# Patient Record
Sex: FEMALE | Race: WHITE | NOT HISPANIC OR LATINO | URBAN - METROPOLITAN AREA
[De-identification: names, ages, dates, MRNs, and addresses within clinical notes are randomized per-mention and may not be internally consistent; named-entity substitution may affect disease eponyms.]

---

## 2022-05-06 ENCOUNTER — EMERGENCY (EMERGENCY)
Facility: HOSPITAL | Age: 22
LOS: 1 days | Discharge: HOME | End: 2022-05-06
Attending: EMERGENCY MEDICINE | Admitting: EMERGENCY MEDICINE
Payer: COMMERCIAL

## 2022-05-06 VITALS
RESPIRATION RATE: 16 BRPM | TEMPERATURE: 98 F | DIASTOLIC BLOOD PRESSURE: 92 MMHG | HEIGHT: 70 IN | OXYGEN SATURATION: 98 % | SYSTOLIC BLOOD PRESSURE: 130 MMHG | WEIGHT: 160.06 LBS | HEART RATE: 62 BPM

## 2022-05-06 DIAGNOSIS — R53.83 OTHER FATIGUE: ICD-10-CM

## 2022-05-06 DIAGNOSIS — Z20.822 CONTACT WITH AND (SUSPECTED) EXPOSURE TO COVID-19: ICD-10-CM

## 2022-05-06 DIAGNOSIS — F32.A DEPRESSION, UNSPECIFIED: ICD-10-CM

## 2022-05-06 DIAGNOSIS — D69.6 THROMBOCYTOPENIA, UNSPECIFIED: ICD-10-CM

## 2022-05-06 LAB
ALBUMIN SERPL ELPH-MCNC: 4.6 G/DL — SIGNIFICANT CHANGE UP (ref 3.3–5)
ALP SERPL-CCNC: 66 U/L — SIGNIFICANT CHANGE UP (ref 40–120)
ALT FLD-CCNC: 10 U/L — SIGNIFICANT CHANGE UP (ref 10–45)
ANION GAP SERPL CALC-SCNC: 9 MMOL/L — SIGNIFICANT CHANGE UP (ref 5–17)
APPEARANCE UR: CLEAR — SIGNIFICANT CHANGE UP
APTT BLD: 28.9 SEC — SIGNIFICANT CHANGE UP (ref 27.5–35.5)
AST SERPL-CCNC: 15 U/L — SIGNIFICANT CHANGE UP (ref 10–40)
BACTERIA # UR AUTO: SIGNIFICANT CHANGE UP /HPF
BASOPHILS # BLD AUTO: 0.06 K/UL — SIGNIFICANT CHANGE UP (ref 0–0.2)
BASOPHILS NFR BLD AUTO: 0.9 % — SIGNIFICANT CHANGE UP (ref 0–2)
BILIRUB SERPL-MCNC: 0.5 MG/DL — SIGNIFICANT CHANGE UP (ref 0.2–1.2)
BILIRUB UR-MCNC: NEGATIVE — SIGNIFICANT CHANGE UP
BUN SERPL-MCNC: 11 MG/DL — SIGNIFICANT CHANGE UP (ref 7–23)
CALCIUM SERPL-MCNC: 9.4 MG/DL — SIGNIFICANT CHANGE UP (ref 8.4–10.5)
CHLORIDE SERPL-SCNC: 106 MMOL/L — SIGNIFICANT CHANGE UP (ref 96–108)
CO2 SERPL-SCNC: 26 MMOL/L — SIGNIFICANT CHANGE UP (ref 22–31)
COLOR SPEC: YELLOW — SIGNIFICANT CHANGE UP
CREAT SERPL-MCNC: 0.91 MG/DL — SIGNIFICANT CHANGE UP (ref 0.5–1.3)
DACRYOCYTES BLD QL SMEAR: SLIGHT — SIGNIFICANT CHANGE UP
DIFF PNL FLD: NEGATIVE — SIGNIFICANT CHANGE UP
EGFR: 93 ML/MIN/1.73M2 — SIGNIFICANT CHANGE UP
EOSINOPHIL # BLD AUTO: 0.12 K/UL — SIGNIFICANT CHANGE UP (ref 0–0.5)
EOSINOPHIL NFR BLD AUTO: 1.8 % — SIGNIFICANT CHANGE UP (ref 0–6)
EPI CELLS # UR: SIGNIFICANT CHANGE UP /HPF (ref 0–5)
GIANT PLATELETS BLD QL SMEAR: PRESENT — SIGNIFICANT CHANGE UP
GLUCOSE SERPL-MCNC: 113 MG/DL — HIGH (ref 70–99)
GLUCOSE UR QL: NEGATIVE — SIGNIFICANT CHANGE UP
HAV IGM SER-ACNC: SIGNIFICANT CHANGE UP
HBV CORE AB SER-ACNC: SIGNIFICANT CHANGE UP
HBV CORE IGM SER-ACNC: SIGNIFICANT CHANGE UP
HBV SURFACE AB SER-ACNC: SIGNIFICANT CHANGE UP
HBV SURFACE AG SER-ACNC: SIGNIFICANT CHANGE UP
HCG UR QL: NEGATIVE — SIGNIFICANT CHANGE UP
HCT VFR BLD CALC: 39 % — SIGNIFICANT CHANGE UP (ref 34.5–45)
HCV AB S/CO SERPL IA: 0.06 S/CO — SIGNIFICANT CHANGE UP
HCV AB SERPL-IMP: SIGNIFICANT CHANGE UP
HGB BLD-MCNC: 13.6 G/DL — SIGNIFICANT CHANGE UP (ref 11.5–15.5)
HIV 1+2 AB+HIV1 P24 AG SERPL QL IA: SIGNIFICANT CHANGE UP
INR BLD: 1.04 — SIGNIFICANT CHANGE UP (ref 0.88–1.16)
KETONES UR-MCNC: NEGATIVE — SIGNIFICANT CHANGE UP
LDH SERPL L TO P-CCNC: 179 U/L — SIGNIFICANT CHANGE UP (ref 50–242)
LEUKOCYTE ESTERASE UR-ACNC: NEGATIVE — SIGNIFICANT CHANGE UP
LYMPHOCYTES # BLD AUTO: 1.8 K/UL — SIGNIFICANT CHANGE UP (ref 1–3.3)
LYMPHOCYTES # BLD AUTO: 26.1 % — SIGNIFICANT CHANGE UP (ref 13–44)
MANUAL SMEAR VERIFICATION: SIGNIFICANT CHANGE UP
MCHC RBC-ENTMCNC: 33.3 PG — SIGNIFICANT CHANGE UP (ref 27–34)
MCHC RBC-ENTMCNC: 34.9 GM/DL — SIGNIFICANT CHANGE UP (ref 32–36)
MCV RBC AUTO: 95.4 FL — SIGNIFICANT CHANGE UP (ref 80–100)
MONOCYTES # BLD AUTO: 0.5 K/UL — SIGNIFICANT CHANGE UP (ref 0–0.9)
MONOCYTES NFR BLD AUTO: 7.2 % — SIGNIFICANT CHANGE UP (ref 2–14)
NEUTROPHILS # BLD AUTO: 4.42 K/UL — SIGNIFICANT CHANGE UP (ref 1.8–7.4)
NEUTROPHILS NFR BLD AUTO: 64 % — SIGNIFICANT CHANGE UP (ref 43–77)
NITRITE UR-MCNC: NEGATIVE — SIGNIFICANT CHANGE UP
PH UR: 6 — SIGNIFICANT CHANGE UP (ref 5–8)
PLAT MORPH BLD: NORMAL — SIGNIFICANT CHANGE UP
PLATELET # BLD AUTO: 72 K/UL — LOW (ref 150–400)
POIKILOCYTOSIS BLD QL AUTO: SLIGHT — SIGNIFICANT CHANGE UP
POTASSIUM SERPL-MCNC: 4.2 MMOL/L — SIGNIFICANT CHANGE UP (ref 3.5–5.3)
POTASSIUM SERPL-SCNC: 4.2 MMOL/L — SIGNIFICANT CHANGE UP (ref 3.5–5.3)
PROT SERPL-MCNC: 7 G/DL — SIGNIFICANT CHANGE UP (ref 6–8.3)
PROT UR-MCNC: ABNORMAL MG/DL
PROTHROM AB SERPL-ACNC: 12.4 SEC — SIGNIFICANT CHANGE UP (ref 10.5–13.4)
RBC # BLD: 4.09 M/UL — SIGNIFICANT CHANGE UP (ref 3.8–5.2)
RBC # FLD: 12 % — SIGNIFICANT CHANGE UP (ref 10.3–14.5)
RBC BLD AUTO: ABNORMAL
RBC CASTS # UR COMP ASSIST: < 5 /HPF — SIGNIFICANT CHANGE UP
SARS-COV-2 RNA SPEC QL NAA+PROBE: NEGATIVE — SIGNIFICANT CHANGE UP
SMUDGE CELLS # BLD: PRESENT — SIGNIFICANT CHANGE UP
SODIUM SERPL-SCNC: 141 MMOL/L — SIGNIFICANT CHANGE UP (ref 135–145)
SP GR SPEC: 1.02 — SIGNIFICANT CHANGE UP (ref 1–1.03)
UROBILINOGEN FLD QL: 0.2 E.U./DL — SIGNIFICANT CHANGE UP
WBC # BLD: 6.9 K/UL — SIGNIFICANT CHANGE UP (ref 3.8–10.5)
WBC # FLD AUTO: 6.9 K/UL — SIGNIFICANT CHANGE UP (ref 3.8–10.5)
WBC UR QL: < 5 /HPF — SIGNIFICANT CHANGE UP

## 2022-05-06 PROCEDURE — 86706 HEP B SURFACE ANTIBODY: CPT

## 2022-05-06 PROCEDURE — 83615 LACTATE (LD) (LDH) ENZYME: CPT

## 2022-05-06 PROCEDURE — 87389 HIV-1 AG W/HIV-1&-2 AB AG IA: CPT

## 2022-05-06 PROCEDURE — 87086 URINE CULTURE/COLONY COUNT: CPT

## 2022-05-06 PROCEDURE — 86709 HEPATITIS A IGM ANTIBODY: CPT

## 2022-05-06 PROCEDURE — 86803 HEPATITIS C AB TEST: CPT

## 2022-05-06 PROCEDURE — 80053 COMPREHEN METABOLIC PANEL: CPT

## 2022-05-06 PROCEDURE — 85385 FIBRINOGEN ANTIGEN: CPT

## 2022-05-06 PROCEDURE — 99284 EMERGENCY DEPT VISIT MOD MDM: CPT

## 2022-05-06 PROCEDURE — 81001 URINALYSIS AUTO W/SCOPE: CPT

## 2022-05-06 PROCEDURE — 36415 COLL VENOUS BLD VENIPUNCTURE: CPT

## 2022-05-06 PROCEDURE — 85025 COMPLETE CBC W/AUTO DIFF WBC: CPT

## 2022-05-06 PROCEDURE — 85610 PROTHROMBIN TIME: CPT

## 2022-05-06 PROCEDURE — 87340 HEPATITIS B SURFACE AG IA: CPT

## 2022-05-06 PROCEDURE — 81025 URINE PREGNANCY TEST: CPT

## 2022-05-06 PROCEDURE — 86038 ANTINUCLEAR ANTIBODIES: CPT

## 2022-05-06 PROCEDURE — 86704 HEP B CORE ANTIBODY TOTAL: CPT

## 2022-05-06 PROCEDURE — 87635 SARS-COV-2 COVID-19 AMP PRB: CPT

## 2022-05-06 PROCEDURE — 85730 THROMBOPLASTIN TIME PARTIAL: CPT

## 2022-05-06 PROCEDURE — 86705 HEP B CORE ANTIBODY IGM: CPT

## 2022-05-06 PROCEDURE — 99283 EMERGENCY DEPT VISIT LOW MDM: CPT

## 2022-05-06 NOTE — ED ADULT NURSE NOTE - CHIEF COMPLAINT QUOTE
Patient reporting [place] MD informed low platelet count, "68."  Patient states recently discharged from inpatient psychiatric unit at [facility] ON 4/15/22, prescribed Zoloft, but it was just switched to Seroquel 2 days ago due to low platelets.  Denies SI/HI/AH/VH.

## 2022-05-06 NOTE — ED PROVIDER NOTE - NSFOLLOWUPINSTRUCTIONS_ED_ALL_ED_FT
follow up in 1-2 weeks.     Thrombocytopenia    WHAT YOU NEED TO KNOW:    What is thrombocytopenia? Thrombocytopenia is a condition that develops because your body does not have enough platelets. Platelets are cells that help your blood to clot. Your body may not be making enough platelets, or it may be destroying too many platelets. When platelets become low, your risk for bleeding increases. Severe bleeding may become life-threatening.    What increases my risk for thrombocytopenia?   •Pregnancy      •Dialysis      •Blood transfusions      •Medicines, such as heparin or NSAIDs      •Cancer treatments, such as chemotherapy or radiation      What are the signs and symptoms of thrombocytopenia? Your signs and symptoms depend on your platelet count. A lower platelet count will cause more severe symptoms. You may not have any symptoms. You may bleed or bruise more easily or have tiny red or purple spots on your skin. You may feel tired or bleed from your gums or nose. You may have blood in your urine or bowel movement. You may have heavy menstrual bleeding if you are a woman.    How is thrombocytopenia diagnosed? Your healthcare provider will ask if you take any medicine or supplements. Tell him or her if you have other health conditions. You may need blood tests to count your platelets or time how fast your blood clots.    How is thrombocytopenia treated?   •Medicines can help increase platelet production and prevent bleeding.      •Platelet transfusions may be used to decrease the risk for bleeding or to stop severe bleeding.      What can I do to prevent or manage bleeding?   •Care for cuts, scrapes, or nosebleeds. Examine your skin for minor bumps, scrapes, and cuts. These injuries can increase your risk for bleeding that can become life-threatening. Apply firm, steady, pressure to cuts or scrapes. Use gauze or a clean towel. If possible, elevate the body part above the level of your heart. If your nose bleeds, pinch the top of your nose until bleeding stops.      •Be careful with skin and mouth care. Use a soft washcloth when you bathe. Use a soft toothbrush to keep your gums from bleeding. Use lip balm to prevent your lips from cracking. Apply lotion to dry skin. Keep your nails trimmed. If you shave, use an electric shaver.      •Do not strain when you have a bowel movement. The strain can increase pressure in your brain and cause bleeding. Ask your healthcare provider about a stool softener or laxative if you are constipated. Do not use enemas or suppositories.      •Use a cool mist humidifier to increase moisture in your home. Moisture may help prevent coughing or nosebleeds. Coughing can increase pressure in your brain and could cause bleeding.      •Avoid activities that may cause scratches or bruises. You may not be able to play contact sports such as football, hockey, or wrestling. Ask your healthcare provider which activities are safe for you.      •Do not take aspirin or NSAIDs. These medicines can cause you to bleed and bruise more easily.      What do I need to know about medical alert identification? Wear medical alert jewelry or carry a card that says you have thrombocytopenia. Ask your healthcare provider where to get these items.  Medical Alert Jewelry         Call your local emergency number (911 in the [place]) for any of the following:   •You have chest pain, tightness, or heaviness that spreads to your shoulders, arms, jaw, neck, or back.      •You have weakness on one side of your body, a severe headache, trouble speaking, or a change in vision.      When should I seek immediate care?   •You have bleeding that does not stop after you elevate and place pressure on the area.      •You vomit blood or material that looks like coffee grounds.      •Your arm or leg feels warm, tender, and painful. It may look swollen and red.      •You suddenly feel lightheaded, dizzy, or weak.      When should I call my doctor or hematologist?   •You have bleeding from your gums, mouth, or nose.      •You have irregular or heavy menstrual bleeding.      •You have blood in your urine or bowel movement.      •You have more bruises or small red or purple spots on your skin.      •You have questions or concerns about your condition or care.      CARE AGREEMENT:    You have the right to help plan your care. Learn about your health condition and how it may be treated. Discuss treatment options with your healthcare providers to decide what care you want to receive. You always have the right to refuse treatment

## 2022-05-06 NOTE — ED PROVIDER NOTE - CARE PROVIDER_API CALL
Christine Loredo)  Hematology; Medical Oncology  [address], [address]  [place] [address]  Phone: [phone]  Fax: [phone]  Follow Up Time:     Leonor Salgado)  Internal Medicine  [address] [place] [address]  Phone: [phone]  Fax: [phone]  Follow Up Time:

## 2022-05-06 NOTE — ED ADULT TRIAGE NOTE - CHIEF COMPLAINT QUOTE
Patient reporting [place] MD informed low platelet count, "68."  Patient states recently discharged from inpatient psychiatric unit at [facility] ON 4/15/22, prescribed Zoloft, but it was just switched to Seroquel 2 days ago due to low platelets.  Denies SI/HI/AH/VH.

## 2022-05-06 NOTE — ED PROVIDER NOTE - CONDITION AT DISCHARGE:
----- Message from RICHMOND Gleason sent at 9/29/2020 10:42 AM EDT -----  Labs are appropriate/stable for PM pt. Cholesterol is slightly high but similar to what it was last year. Her LDL is 125, ideal is less then 100. I recommend f/u with PCP and enc to c/w good diet and exercise as well   Called pt, pt not'd    Faxed.   Left message   Patient returned, pt not'd, pt is requesting to have the blood work results faxed over to her, she would like to compare the results from last year vs this year herself. Please advise       Fax number [phone]    Statement Selected Improved

## 2022-05-06 NOTE — ED PROVIDER NOTE - OBJECTIVE STATEMENT
20 y/o F w/ Hx of depression, 1 month ago was in psych admission for depression, Prozac was changed to Zoloft 25mg at the time and was told she has low platelet count but unsure of number, 2 days ago changed from Prozac to Seroquel, presents today for fatigue and depression. No SI. Has seen [facility] yesterday to f/u on platelet count and was found to have platelet 65 at the time. LMP few weeks ago which was heavier than usual. No lightheadedness. No headache. Occasional full sensation of head. No vision changes or weakness. No gum bleeding w/ teeth brushing. No rash. No black or bloody stool. No Hx of prior low platelet. No autoimmune problems. No Hx of ASD.

## 2022-05-06 NOTE — ED PROVIDER NOTE - CLINICAL SUMMARY MEDICAL DECISION MAKING FREE TEXT BOX
20 y/o F w/ outpatient asymptomatic thrombocytopenia w/ normal exam. Re-eval for thrombocytopenia. Depending on result, consult hematology vs. outpatient f/u.    Dispo pending, workup, and re-eval. 22 y/o F w/ outpatient asymptomatic thrombocytopenia w/ normal exam. Re-eval for thrombocytopenia. Depending on result, consult hematology vs. outpatient f/u.    Dispo pending, workup, and re-eval.    suspect due to recent SSRI,   discussed w heme fellow , as assymptomatic and recent d/c SSRI, plan for outpt f/u in 1-2 wks   emergent return instructions were discussed with patient

## 2022-05-08 LAB
CULTURE RESULTS: SIGNIFICANT CHANGE UP
SPECIMEN SOURCE: SIGNIFICANT CHANGE UP

## 2022-05-10 LAB — FIBRINOGEN AG PPP IA-MCNC: 254 MG/DL — SIGNIFICANT CHANGE UP (ref 180–350)

## 2022-05-11 LAB — ANA TITR SER: NEGATIVE — SIGNIFICANT CHANGE UP
